# Patient Record
Sex: FEMALE | URBAN - METROPOLITAN AREA
[De-identification: names, ages, dates, MRNs, and addresses within clinical notes are randomized per-mention and may not be internally consistent; named-entity substitution may affect disease eponyms.]

---

## 2023-01-25 ENCOUNTER — TELEPHONE (OUTPATIENT)
Dept: RHEUMATOLOGY | Facility: CLINIC | Age: 78
End: 2023-01-25

## 2023-01-25 NOTE — TELEPHONE ENCOUNTER
Spke to pt  in regards to rheum referral for pt.    Pt  stated pt had back sx on 1/19 and had a fall maybe Monday morning or Tuesday morning around 4am. Pt is currently in the hospital and will attending PT in the hospital. Pt  stated it may be a week before she start feeling better.    I informed pt  to call our office when pt is doing better so we can get her scheduled for an appt. Provided pt spouse with provider name, department and phone number.     Pt spouse understanding was verbalized.          Will scan in pt referral into media.

## 2023-02-24 ENCOUNTER — TELEPHONE (OUTPATIENT)
Dept: RHEUMATOLOGY | Facility: CLINIC | Age: 78
End: 2023-02-24
